# Patient Record
Sex: FEMALE | Race: WHITE | NOT HISPANIC OR LATINO | Employment: UNEMPLOYED | URBAN - METROPOLITAN AREA
[De-identification: names, ages, dates, MRNs, and addresses within clinical notes are randomized per-mention and may not be internally consistent; named-entity substitution may affect disease eponyms.]

---

## 2024-02-06 PROBLEM — O28.3 ABNORMAL FETAL ULTRASOUND: Status: ACTIVE | Noted: 2024-02-06

## 2024-02-06 PROBLEM — D56.3 BETA THALASSEMIA TRAIT: Status: ACTIVE | Noted: 2024-02-06

## 2024-02-06 PROBLEM — O99.012 ANEMIA DURING PREGNANCY IN SECOND TRIMESTER (HHS-HCC): Status: ACTIVE | Noted: 2024-02-06

## 2024-02-06 PROBLEM — L98.9 PAINFUL SKIN LESION: Status: ACTIVE | Noted: 2024-02-06

## 2024-02-06 PROBLEM — D64.9 ANEMIA: Status: ACTIVE | Noted: 2024-02-06

## 2024-02-07 ENCOUNTER — OFFICE VISIT (OUTPATIENT)
Dept: PRIMARY CARE | Facility: CLINIC | Age: 34
End: 2024-02-07
Payer: COMMERCIAL

## 2024-02-07 VITALS
WEIGHT: 201 LBS | HEIGHT: 67 IN | HEART RATE: 74 BPM | DIASTOLIC BLOOD PRESSURE: 78 MMHG | BODY MASS INDEX: 31.55 KG/M2 | OXYGEN SATURATION: 99 % | SYSTOLIC BLOOD PRESSURE: 130 MMHG

## 2024-02-07 DIAGNOSIS — H61.22 HEARING LOSS DUE TO CERUMEN IMPACTION, LEFT: ICD-10-CM

## 2024-02-07 DIAGNOSIS — E03.9 ACQUIRED HYPOTHYROIDISM: Primary | ICD-10-CM

## 2024-02-07 PROBLEM — E03.8 HYPOTHYROIDISM DUE TO HASHIMOTO'S THYROIDITIS: Status: ACTIVE | Noted: 2024-02-07

## 2024-02-07 PROBLEM — O99.012 ANEMIA DURING PREGNANCY IN SECOND TRIMESTER (HHS-HCC): Status: RESOLVED | Noted: 2024-02-06 | Resolved: 2024-02-07

## 2024-02-07 PROBLEM — E06.3 HYPOTHYROIDISM DUE TO HASHIMOTO'S THYROIDITIS: Status: ACTIVE | Noted: 2024-02-07

## 2024-02-07 PROBLEM — O28.3 ABNORMAL FETAL ULTRASOUND: Status: RESOLVED | Noted: 2024-02-06 | Resolved: 2024-02-07

## 2024-02-07 PROCEDURE — 1036F TOBACCO NON-USER: CPT | Performed by: NURSE PRACTITIONER

## 2024-02-07 PROCEDURE — 99203 OFFICE O/P NEW LOW 30 MIN: CPT | Performed by: NURSE PRACTITIONER

## 2024-02-07 PROCEDURE — 69209 REMOVE IMPACTED EAR WAX UNI: CPT | Performed by: NURSE PRACTITIONER

## 2024-02-07 RX ORDER — LEVOTHYROXINE SODIUM 25 UG/1
25 TABLET ORAL DAILY
COMMUNITY

## 2024-02-07 ASSESSMENT — PATIENT HEALTH QUESTIONNAIRE - PHQ9
SUM OF ALL RESPONSES TO PHQ9 QUESTIONS 1 & 2: 0
2. FEELING DOWN, DEPRESSED OR HOPELESS: NOT AT ALL
1. LITTLE INTEREST OR PLEASURE IN DOING THINGS: NOT AT ALL

## 2024-02-07 NOTE — PROGRESS NOTES
"Subjective   Patient ID: Shailesh Hart is a 33 y.o. female who presents for new pt to establish.      HPI   Prior PCP Dr Martha Allred at [facility]  Last CPE approx 1/2023    Hypothyroidism: ENT Dr Yoanna Diego   Med: Levothyroxine  Diagnosed 2017    Currently c/o left ear feeling blocked, hearing muffled  Attempted to call ENT       Review of Systems    Objective   /78   Pulse 74   Ht 1.702 m (5' 7\")   Wt 91.2 kg (201 lb)   SpO2 99%   BMI 31.48 kg/m²     Physical Exam    Alert and oriented x 3  Appears healthy  Speech clear.  Hearing adequate.  Psych: Normal affect. Good judgment and insight.   Right otoscope exam normal  Left otoscope exam reveals cerumen     Reviewed Medical History form completed by patient  Attempted to find prior PCP notes--unsuccessful     Assessment/Plan     New Patient    1. Hearing loss due to cerumen impaction, left    Ear Cerumen Removal    Date/Time: 2/7/2024 2:56 PM    Performed by: HAYDEE Davis  Authorized by: HAYDEE Davis    Consent:     Consent obtained:  Verbal    Consent given by:  Patient    Risks, benefits, and alternatives were discussed: yes      Risks discussed:  Dizziness, incomplete removal, infection, pain and TM perforation    Alternatives discussed:  No treatment  Universal protocol:     Procedure explained and questions answered to patient or proxy's satisfaction: yes      Patient identity confirmed:  Verbally with patient  Procedure details:     Location:  L ear    Procedure type: irrigation      Procedure outcomes: cerumen removed    Post-procedure details:     Inspection:  TM intact    Hearing quality:  Improved    Procedure completion:  Tolerated well, no immediate complications    Follow up with ENT if needed    2. Acquired hypothyroidism  Managed by ENT    Follow up: CPE with labs    Medications refills will be completed as discussed.     Any labs or testing that is ordered will be reviewed and the results will be in your " chart .   You can review these via  [email or web address].     Prescriptions will not be filled unless you are compliant with your follow-up appointments or have a follow-up appointment scheduled as per the instruction of your provider. Refills for medications should be requested at the time of your office visit.     Please allow one week for refill requests to be completed.     Contact office with any questions or concerns.   Preferred communication is via  [email or web address]  Please contact [email or web address] if having issues with  [email or web address]    Lyudmila Chaudhary [facility]  [address], [address]  [place] [address]  Phone: [phone]    **Charting was completed using voice recognition technology and may include unintended errors**

## 2025-02-26 ENCOUNTER — OFFICE VISIT (OUTPATIENT)
Dept: PRIMARY CARE | Facility: CLINIC | Age: 35
End: 2025-02-26
Payer: MEDICAID

## 2025-03-12 ENCOUNTER — OFFICE VISIT (OUTPATIENT)
Dept: PRIMARY CARE | Facility: CLINIC | Age: 35
End: 2025-03-12
Payer: MEDICAID

## 2025-03-12 ENCOUNTER — APPOINTMENT (OUTPATIENT)
Dept: PRIMARY CARE | Facility: CLINIC | Age: 35
End: 2025-03-12
Payer: MEDICAID

## 2025-03-12 VITALS
WEIGHT: 202 LBS | SYSTOLIC BLOOD PRESSURE: 120 MMHG | DIASTOLIC BLOOD PRESSURE: 72 MMHG | OXYGEN SATURATION: 97 % | HEART RATE: 77 BPM | BODY MASS INDEX: 31.71 KG/M2 | HEIGHT: 67 IN

## 2025-03-12 DIAGNOSIS — Z00.00 HEALTH CARE MAINTENANCE: ICD-10-CM

## 2025-03-12 DIAGNOSIS — D56.3 BETA THALASSEMIA TRAIT: ICD-10-CM

## 2025-03-12 DIAGNOSIS — E66.811 CLASS 1 OBESITY DUE TO EXCESS CALORIES WITHOUT SERIOUS COMORBIDITY WITH BODY MASS INDEX (BMI) OF 31.0 TO 31.9 IN ADULT: Primary | ICD-10-CM

## 2025-03-12 DIAGNOSIS — D64.9 ANEMIA, UNSPECIFIED TYPE: ICD-10-CM

## 2025-03-12 DIAGNOSIS — E03.9 ACQUIRED HYPOTHYROIDISM: ICD-10-CM

## 2025-03-12 DIAGNOSIS — E66.09 CLASS 1 OBESITY DUE TO EXCESS CALORIES WITHOUT SERIOUS COMORBIDITY WITH BODY MASS INDEX (BMI) OF 31.0 TO 31.9 IN ADULT: Primary | ICD-10-CM

## 2025-03-12 PROCEDURE — 3008F BODY MASS INDEX DOCD: CPT | Performed by: NURSE PRACTITIONER

## 2025-03-12 PROCEDURE — 1036F TOBACCO NON-USER: CPT | Performed by: NURSE PRACTITIONER

## 2025-03-12 PROCEDURE — 99395 PREV VISIT EST AGE 18-39: CPT | Performed by: NURSE PRACTITIONER

## 2025-03-12 NOTE — PROGRESS NOTES
Subjective     35 yo female that presents for CPE  Recent hospitalizations, surgeries or ER visits: denies     Diet:  healthy      unhealthy     mix of healthy and unhealthy  Caffeine per day: 1  Water per day:   Exercise: active with children   Alcohol: denies   Tobacco: denies   Pap/Pelvic: 11/28/22 Dr Umm Estes  Mammogram:   DEXA:   Colonoscopy:   date                 f/u  Cologuard:    Hypothyroidism: ENT Dr Yoanna Diego   Med: Levothyroxine  Diagnosed 2017  Last ultrasound 5 years ago     Weight gain of 20 lbs over past year     Past Medical History:   Diagnosis Date    Abnormal fetal ultrasound 02/06/2024    Anemia during pregnancy in second trimester ([facility]) 02/06/2024    Hypothyroidism     Thalassemia trait       Past Surgical History:   Procedure Laterality Date    NO PAST SURGERIES         Family History   Problem Relation Name Age of Onset    Diabetes Mother      Cancer Mother      Hypothyroidism Mother      Thalassemia Father      Heart failure Maternal Grandmother      Heart failure Maternal Grandfather        Social History     Socioeconomic History    Marital status:      Spouse name: Cynthia    Number of children: 5    Years of education: Not on file    Highest education level: Not on file   Occupational History    Occupation: Stay at home mom   Tobacco Use    Smoking status: Never    Smokeless tobacco: Never   Substance and Sexual Activity    Alcohol use: Never    Drug use: Never    Sexual activity: Not on file   Other Topics Concern    Not on file   Social History Narrative    Not on file     Social Drivers of Health     Financial Resource Strain: Not on file   Food Insecurity: Not on file   Transportation Needs: Not on file   Physical Activity: Not on file   Stress: Not on file   Social Connections: Not on file   Intimate Partner Violence: Not on file   Housing Stability: Not on file       Current Outpatient Medications on File Prior to Visit   Medication Sig Dispense Refill     "levothyroxine (Synthroid, Levoxyl) 25 mcg tablet Take 1 tablet (25 mcg) by mouth once daily.       No current facility-administered medications on file prior to visit.       Allergies   Allergen Reactions    Ibuprofen Swelling    Naproxen Sodium Other and Swelling     tonque swell with all nsad    Amoxicillin Hives and Rash         Visit Vitals  /72   Pulse 77   Ht 1.702 m (5' 7\")   Wt 91.6 kg (202 lb)   SpO2 97%   BMI 31.64 kg/m²   OB Status Having periods   Smoking Status Never   BSA 2.08 m²        Physical Exam    Gen: Alert and oriented x3 female in no acute distress.  HEENT: Head is normocephalic.  Neck is supple without carotid bruits  Heart: Regular rate and rhythm without murmurs.  Lungs: Clear to auscultation bilaterally.  Breast:         Deferred to Gyn  Pelvic:            Deferred to Gyn   Abdomen: Soft with normal bowel sounds.  No masses or pain to palpation.   Extremities: Good range of motion of all joints.  No significant edema. Pedal pulses +1-2/4  Neuro: No signs of focal neurologic deficit.  No tremor.  Speech and hearing are normal.  DTRs +3/4;  Muscle Strength +5/5.  Musculoskeletal: Spine with good ROM.  Leg lengths are equal.  Skin: No significant or irregular nevi visualized.  Psych: normal affect.  No suicidal ideation.  Good judgment and insight.    Diagnosis/Plan    1. Health care maintenance      2. Acquired hypothyroidism  She will follow up with surgeon but would also like a referral to endocrine .  - Referral to Endocrinology; Future    3. Class 1 obesity due to excess calories without serious comorbidity with body mass index (BMI) of 31.0 to 31.9 in adult (Primary)  Patient encouraged to follow diet of lower carbohydrates and increase vegetable and fruit intake.   Patient also encouraged to increase water intake to 80 ounces/day.   Start or continue exercise for at least 30 minutes a day on most days of the week.     offers various ways to learn about healthy eating and " "healthy weight loss.     Helpful websites  Www.Myplate.gov  https://diabetes.org/food-nutrition/understanding-carbs/carb-counting-and-diabetes   [employer]: resource for finding low-carb meal plans, snack lists and tons of recipes.  There is a wealth of info on the American Heart Association's website--\"Life's Essential 8\"     - Referral to Endocrinology; Future    4. Beta thalassemia trait    - Iron and TIBC; Future  - Vitamin B12; Future  - Ferritin; Future  - Iron and TIBC  - Vitamin B12  - Ferritin    5. Anemia, unspecified type    - Iron and TIBC; Future  - Vitamin B12; Future  - Ferritin; Future  - Iron and TIBC  - Vitamin B12  - Ferritin      Medications refills will be completed as discussed.     Any labs or testing that is ordered will be reviewed and the results will be in your chart .   You can review these via  [employer].     Follow up 1 year for CPE    Prescriptions will not be filled unless you are compliant with your follow-up appointments or have a follow-up appointment scheduled as per the instruction of your provider. Refills for medications should be requested at the time of your office visit.     Please allow one week for refill requests to be completed.     Contact office with any questions or concerns.   Preferred communication is via  [employer]  Please contact [email or web address] if having issues with  [employer]    Lyudmila DOMINGUEZ-[facility]  [address], [address]  [place] [address]  Phone: [phone]    **Charting was completed using voice recognition technology and may include unintended errors**  "

## 2025-03-16 PROBLEM — Z00.00 HEALTH CARE MAINTENANCE: Status: ACTIVE | Noted: 2025-03-16

## 2025-03-16 PROBLEM — E66.09 CLASS 1 OBESITY DUE TO EXCESS CALORIES WITHOUT SERIOUS COMORBIDITY WITH BODY MASS INDEX (BMI) OF 31.0 TO 31.9 IN ADULT: Status: ACTIVE | Noted: 2025-03-16

## 2025-03-16 PROBLEM — E66.811 CLASS 1 OBESITY DUE TO EXCESS CALORIES WITHOUT SERIOUS COMORBIDITY WITH BODY MASS INDEX (BMI) OF 31.0 TO 31.9 IN ADULT: Status: ACTIVE | Noted: 2025-03-16

## 2025-03-26 LAB
FERRITIN SERPL-MCNC: 11 NG/ML (ref 16–154)
IRON SATN MFR SERPL: 17 % (CALC) (ref 16–45)
IRON SERPL-MCNC: 67 MCG/DL (ref 40–190)
TIBC SERPL-MCNC: 401 MCG/DL (CALC) (ref 250–450)
VIT B12 SERPL-MCNC: 339 PG/ML (ref 200–1100)

## 2025-04-11 ENCOUNTER — OFFICE VISIT (OUTPATIENT)
Dept: PRIMARY CARE | Facility: CLINIC | Age: 35
End: 2025-04-11
Payer: MEDICAID

## 2025-04-11 VITALS
OXYGEN SATURATION: 98 % | SYSTOLIC BLOOD PRESSURE: 128 MMHG | BODY MASS INDEX: 31.32 KG/M2 | TEMPERATURE: 97.8 F | DIASTOLIC BLOOD PRESSURE: 72 MMHG | HEART RATE: 82 BPM | WEIGHT: 200 LBS | RESPIRATION RATE: 18 BRPM

## 2025-04-11 DIAGNOSIS — T14.8XXA PUNCTURE WOUND: Primary | ICD-10-CM

## 2025-04-11 PROCEDURE — 1036F TOBACCO NON-USER: CPT

## 2025-04-11 PROCEDURE — 99213 OFFICE O/P EST LOW 20 MIN: CPT

## 2025-04-11 RX ORDER — DOXYCYCLINE 100 MG/1
100 CAPSULE ORAL 2 TIMES DAILY
Qty: 14 CAPSULE | Refills: 0 | Status: SHIPPED | OUTPATIENT
Start: 2025-04-11 | End: 2025-04-18

## 2025-04-11 NOTE — PROGRESS NOTES
Subjective   Patient ID: Shailesh Hart is a 34 y.o. female who presents for Hand Pain (PCP Lyudmila. Right thumb was punctured by a needle yesterday. It is swollen and painful.).    Patient for puncture wound she received from sewing pin  Had through and through injury to right thumb from pin entered along palmar surface and exited through dorsum of thumb.  Denies any significant dirt or rust exposure associated.  Since that time area feels painful however has not noticed any worsening pain, no drainage of pus.  Does not endorse any fever.         Review of Systems    Objective   /72 (BP Location: Right arm, Patient Position: Sitting)   Pulse 82   Temp 36.6 °C (97.8 °F)   Resp 18   Wt 90.7 kg (200 lb)   SpO2 98%   BMI 31.32 kg/m²     Physical Exam  Skin:     Comments: 2 puncture wounds notable along through and through injury site.  First site at middle of palmar surface of distal thumb with exit wound at lateral to nail.  No discernible fluctuance.  No joint tenderness at first digit DIP.  No discernible abscess.  Area of injury appears closed.  No tracking erythema         Assessment/Plan   Problem List Items Addressed This Visit             ICD-10-CM    Puncture wound - Primary T14.8XXA    Relevant Medications    doxycycline (Vibramycin) 100 mg capsule   Given the depth of the wound approximately full thickness of thumb will prescribe antibiotic.  Recommend patient wait 1 final day as area is closed and does not show any signs of infection at this time.  If symptoms worsen recommend that she begin taking antibiotic for the next 1 week and follow-up in office for reevaluation.  Last tetanus with daughter 4 years prior.  Appears clean and mechanism of injury does not endorse significantly soiled puncture instrument.  No need for Tdap at this time.    Contact office for any worsening otherwise follow-up as needed    Espinoza Salazar, DO

## 2025-04-28 DIAGNOSIS — D50.9 IRON DEFICIENCY ANEMIA, UNSPECIFIED IRON DEFICIENCY ANEMIA TYPE: Primary | ICD-10-CM

## 2025-04-28 RX ORDER — FERROUS SULFATE 325(65) MG
325 TABLET, DELAYED RELEASE (ENTERIC COATED) ORAL
Qty: 30 TABLET | Refills: 11 | Status: SHIPPED | OUTPATIENT
Start: 2025-04-28

## 2025-06-20 ENCOUNTER — OFFICE VISIT (OUTPATIENT)
Dept: PRIMARY CARE | Facility: CLINIC | Age: 35
End: 2025-06-20
Payer: MEDICAID

## 2025-06-20 VITALS
DIASTOLIC BLOOD PRESSURE: 58 MMHG | BODY MASS INDEX: 31.7 KG/M2 | SYSTOLIC BLOOD PRESSURE: 122 MMHG | TEMPERATURE: 97.2 F | WEIGHT: 202.4 LBS | HEART RATE: 92 BPM | OXYGEN SATURATION: 97 %

## 2025-06-20 DIAGNOSIS — H65.01 NON-RECURRENT ACUTE SEROUS OTITIS MEDIA OF RIGHT EAR: ICD-10-CM

## 2025-06-20 DIAGNOSIS — J02.9 PHARYNGITIS, UNSPECIFIED ETIOLOGY: Primary | ICD-10-CM

## 2025-06-20 LAB
POC RAPID STREP: NEGATIVE
POC SARS-COV-2 AG BINAX: NORMAL

## 2025-06-20 PROCEDURE — 87880 STREP A ASSAY W/OPTIC: CPT

## 2025-06-20 PROCEDURE — 87811 SARS-COV-2 COVID19 W/OPTIC: CPT

## 2025-06-20 PROCEDURE — 99214 OFFICE O/P EST MOD 30 MIN: CPT

## 2025-06-20 RX ORDER — DOXYCYCLINE 100 MG/1
100 CAPSULE ORAL 2 TIMES DAILY
Qty: 14 CAPSULE | Refills: 0 | Status: SHIPPED | OUTPATIENT
Start: 2025-06-20 | End: 2025-06-27

## 2025-06-20 ASSESSMENT — PATIENT HEALTH QUESTIONNAIRE - PHQ9
SUM OF ALL RESPONSES TO PHQ9 QUESTIONS 1 AND 2: 0
1. LITTLE INTEREST OR PLEASURE IN DOING THINGS: NOT AT ALL
2. FEELING DOWN, DEPRESSED OR HOPELESS: NOT AT ALL

## 2025-06-23 ASSESSMENT — ENCOUNTER SYMPTOMS: SORE THROAT: 1

## 2025-06-24 NOTE — PROGRESS NOTES
Subjective   Patient ID: Shailesh Hart is a 34 y.o. female who presents for Nasal Congestion and Sore Throat.    Sore Throat        History of Present Illness  The patient is a 34-year-old female who presents today with the primary reason for this visit being a sore throat and upper respiratory infection (URI) symptoms.    Her symptoms began 3 days ago, initially presenting as a headache and sore throat upon awakening, accompanied by swollen and slightly burning tonsils. Over the past 2 days, she has developed rhinorrhea, cough, and mucus accumulation, which she perceives as progressively worsening. She reports no fever but experiences severe dryness in her throat at night due to mouth breathing, which disrupts her sleep. She reports no shortness of breath and no recent illness in her household. Her most distressing symptoms are her sore throat and runny nose. She has not monitored her temperature at home but does not feel febrile. Her cough intensifies at night, leading to sleep deprivation and exhaustion over the past 2 days. She has not previously used Tessalon Perles for cough management. She experienced ear pain last night, which she attributes to lying on her back and mucus drainage, and requests an examination to rule out an ear infection.     She has been self-medicating with Benadryl, Claritin, and Tylenol, but these have not provided relief. She consumed warm tea with honey this morning to alleviate her throat discomfort. She has no history of asthma and does not use tobacco products. She is allergic to penicillin, which causes a rash and hives, and Aleve and naproxen, which cause swelling of her mouth and lips. She has experienced a yeast infection once during pregnancy while on antibiotics.    SOCIAL HISTORY  She does not smoke cigarettes or any other substances.       Review of Systems   HENT:  Positive for sore throat.        Objective   /58 (BP Location: Right arm, Patient Position: Sitting)    Pulse 92   Temp 36.2 °C (97.2 °F)   Wt 91.8 kg (202 lb 6.4 oz)   SpO2 97%   BMI 31.70 kg/m²     Physical Exam  Constitutional:       General: She is not in acute distress.  HENT:      Left Ear: Tympanic membrane normal.      Ears:      Comments: Bulging erythematous right tympanic membrane no discernible exudate or purulence behind drum     Nose: No congestion.      Mouth/Throat:      Mouth: Mucous membranes are moist.      Pharynx: No oropharyngeal exudate or posterior oropharyngeal erythema.   Eyes:      General:         Right eye: No discharge.         Left eye: No discharge.      Conjunctiva/sclera: Conjunctivae normal.   Cardiovascular:      Rate and Rhythm: Normal rate and regular rhythm.      Pulses: Normal pulses.      Heart sounds: No murmur heard.     No friction rub. No gallop.   Pulmonary:      Effort: Pulmonary effort is normal. No respiratory distress.      Breath sounds: No wheezing, rhonchi or rales.   Musculoskeletal:      Cervical back: No tenderness.   Lymphadenopathy:      Cervical: No cervical adenopathy.   Neurological:      Mental Status: She is alert.       Physical Exam  Ears: Erythematous and bulging right tympanic membrane, no pus noted  Mouth/Throat: Swollen tonsils, no fever  Respiratory: Clear to auscultation, no wheezing, rales or rhonchi       Assessment/Plan   Problem List Items Addressed This Visit    None  Visit Diagnoses         Codes      Pharyngitis, unspecified etiology    -  Primary J02.9    Relevant Orders    POCT BinaxNOW Covid-19 Ag Card manually resulted (Completed)    POCT Rapid Strep A manually resulted (Completed)      Non-recurrent acute serous otitis media of right ear     H65.01    Relevant Medications    doxycycline (Vibramycin) 100 mg capsule          Assessment & Plan  1. Upper respiratory infection (URI).  - Symptoms began 3 days ago with headache, sore throat, swollen tonsils, and burning sensation. Over the past 2 days, she developed runny nose, cough, and  mucus buildup. No fever reported.  - COVID-19 and strep swabs were negative. Examination revealed swollen tonsils and slightly elevated heart rate, possibly due to Tylenol masking a fever.   Recommended zinc supplementation (100 mg daily for 5 days) to help resolve the viral infection faster.  - Suggested over-the-counter Mucinex DM (dextromethorphan and guaifenesin) for decongestion and cough suppression. Encouraged to drink warm tea with honey 3-4 times per day and avoid caffeinated beverages. If symptoms do not improve by next week, consider starting an antibiotic.    2.  AOM.  - Reported ear pain last night. Examination revealed redness and slight bulging of the ear without pus.  - Allergic to penicillin (rash and hives) and Aleve/naproxen (swelling of mouth and lips).  - Prescription for doxycycline (given penicillin allergy) will be sent to pharmacy to treat the ear infection.  - Continue other supportive measures for URI symptoms.       Espinoza Salazar, DO       This medical note was created with the assistance of artificial intelligence (AI) for documentation purposes. The content has been reviewed and confirmed by the healthcare provider for accuracy and completeness. Patient consented to the use of audio recording and use of AI during their visit.

## 2025-09-09 ENCOUNTER — APPOINTMENT (OUTPATIENT)
Dept: PRIMARY CARE | Facility: CLINIC | Age: 35
End: 2025-09-09
Payer: MEDICAID

## 2025-09-09 DIAGNOSIS — R00.2 PALPITATIONS: ICD-10-CM

## 2026-03-17 ENCOUNTER — APPOINTMENT (OUTPATIENT)
Dept: PRIMARY CARE | Facility: CLINIC | Age: 36
End: 2026-03-17
Payer: MEDICAID